# Patient Record
Sex: MALE | Race: WHITE | Employment: FULL TIME | URBAN - METROPOLITAN AREA
[De-identification: names, ages, dates, MRNs, and addresses within clinical notes are randomized per-mention and may not be internally consistent; named-entity substitution may affect disease eponyms.]

---

## 2023-01-01 ENCOUNTER — APPOINTMENT (OUTPATIENT)
Dept: CT IMAGING | Age: 48
End: 2023-01-01

## 2023-01-01 ENCOUNTER — APPOINTMENT (OUTPATIENT)
Dept: GENERAL RADIOLOGY | Age: 48
End: 2023-01-01

## 2023-01-01 ENCOUNTER — HOSPITAL ENCOUNTER (OUTPATIENT)
Age: 48
Setting detail: OBSERVATION
Discharge: HOME | End: 2023-01-02
Attending: EMERGENCY MEDICINE | Admitting: FAMILY MEDICINE

## 2023-01-01 DIAGNOSIS — R07.9 CHEST PAIN, UNSPECIFIED TYPE: ICD-10-CM

## 2023-01-01 DIAGNOSIS — R20.0 LEFT SIDED NUMBNESS: Primary | ICD-10-CM

## 2023-01-01 LAB
A/G RATIO: 1.6 (ref 1.1–2.2)
ALBUMIN SERPL-MCNC: 4.4 G/DL (ref 3.4–5)
ALP BLD-CCNC: 50 U/L (ref 40–129)
ALT SERPL-CCNC: 14 U/L (ref 10–40)
AMPHETAMINE SCREEN, URINE: ABNORMAL
ANION GAP SERPL CALCULATED.3IONS-SCNC: 9 MMOL/L (ref 3–16)
AST SERPL-CCNC: 17 U/L (ref 15–37)
BANDED NEUTROPHILS RELATIVE PERCENT: 1 % (ref 0–7)
BARBITURATE SCREEN URINE: ABNORMAL
BASOPHILS ABSOLUTE: 0 K/UL (ref 0–0.2)
BASOPHILS RELATIVE PERCENT: 0 %
BENZODIAZEPINE SCREEN, URINE: ABNORMAL
BILIRUB SERPL-MCNC: 0.4 MG/DL (ref 0–1)
BUN BLDV-MCNC: 11 MG/DL (ref 7–20)
CALCIUM SERPL-MCNC: 9.4 MG/DL (ref 8.3–10.6)
CANNABINOID SCREEN URINE: POSITIVE
CHLORIDE BLD-SCNC: 106 MMOL/L (ref 99–110)
CO2: 23 MMOL/L (ref 21–32)
COCAINE METABOLITE SCREEN URINE: ABNORMAL
CREAT SERPL-MCNC: 0.9 MG/DL (ref 0.9–1.3)
EOSINOPHILS ABSOLUTE: 0.3 K/UL (ref 0–0.6)
EOSINOPHILS RELATIVE PERCENT: 2 %
FENTANYL SCREEN, URINE: ABNORMAL
GFR SERPL CREATININE-BSD FRML MDRD: >60 ML/MIN/{1.73_M2}
GLUCOSE BLD-MCNC: 107 MG/DL (ref 70–99)
GLUCOSE BLD-MCNC: 108 MG/DL (ref 70–99)
HCT VFR BLD CALC: 49 % (ref 40.5–52.5)
HEMOGLOBIN: 15.9 G/DL (ref 13.5–17.5)
INR BLD: 0.91 (ref 0.87–1.14)
LACTIC ACID: 1 MMOL/L (ref 0.4–2)
LYMPHOCYTES ABSOLUTE: 2.8 K/UL (ref 1–5.1)
LYMPHOCYTES RELATIVE PERCENT: 22 %
Lab: ABNORMAL
MAGNESIUM: 2 MG/DL (ref 1.8–2.4)
MCH RBC QN AUTO: 29.9 PG (ref 26–34)
MCHC RBC AUTO-ENTMCNC: 32.5 G/DL (ref 31–36)
MCV RBC AUTO: 92 FL (ref 80–100)
METHADONE SCREEN, URINE: ABNORMAL
MONOCYTES ABSOLUTE: 0.8 K/UL (ref 0–1.3)
MONOCYTES RELATIVE PERCENT: 6 %
NEUTROPHILS ABSOLUTE: 8.8 K/UL (ref 1.7–7.7)
NEUTROPHILS RELATIVE PERCENT: 69 %
OPIATE SCREEN URINE: ABNORMAL
OXYCODONE URINE: ABNORMAL
PDW BLD-RTO: 13.5 % (ref 12.4–15.4)
PERFORMED ON: ABNORMAL
PH UA: 5.5
PHENCYCLIDINE SCREEN URINE: ABNORMAL
PLATELET # BLD: 328 K/UL (ref 135–450)
PLATELET SLIDE REVIEW: ADEQUATE
PMV BLD AUTO: 8.4 FL (ref 5–10.5)
POTASSIUM SERPL-SCNC: 4.4 MMOL/L (ref 3.5–5.1)
PROTHROMBIN TIME: 12.1 SEC (ref 11.7–14.5)
RBC # BLD: 5.33 M/UL (ref 4.2–5.9)
RBC # BLD: NORMAL 10*6/UL
SLIDE REVIEW: ABNORMAL
SMUDGE CELLS: PRESENT
SODIUM BLD-SCNC: 138 MMOL/L (ref 136–145)
TOTAL PROTEIN: 7.1 G/DL (ref 6.4–8.2)
TROPONIN: <0.01 NG/ML
WBC # BLD: 12.5 K/UL (ref 4–11)

## 2023-01-01 PROCEDURE — 96372 THER/PROPH/DIAG INJ SC/IM: CPT

## 2023-01-01 PROCEDURE — 6360000004 HC RX CONTRAST MEDICATION: Performed by: EMERGENCY MEDICINE

## 2023-01-01 PROCEDURE — 6370000000 HC RX 637 (ALT 250 FOR IP): Performed by: FAMILY MEDICINE

## 2023-01-01 PROCEDURE — 71045 X-RAY EXAM CHEST 1 VIEW: CPT

## 2023-01-01 PROCEDURE — 70498 CT ANGIOGRAPHY NECK: CPT

## 2023-01-01 PROCEDURE — G0378 HOSPITAL OBSERVATION PER HR: HCPCS

## 2023-01-01 PROCEDURE — 99285 EMERGENCY DEPT VISIT HI MDM: CPT

## 2023-01-01 PROCEDURE — 83605 ASSAY OF LACTIC ACID: CPT

## 2023-01-01 PROCEDURE — 85610 PROTHROMBIN TIME: CPT

## 2023-01-01 PROCEDURE — 6370000000 HC RX 637 (ALT 250 FOR IP): Performed by: EMERGENCY MEDICINE

## 2023-01-01 PROCEDURE — 85025 COMPLETE CBC W/AUTO DIFF WBC: CPT

## 2023-01-01 PROCEDURE — 6360000002 HC RX W HCPCS: Performed by: FAMILY MEDICINE

## 2023-01-01 PROCEDURE — 84484 ASSAY OF TROPONIN QUANT: CPT

## 2023-01-01 PROCEDURE — 36415 COLL VENOUS BLD VENIPUNCTURE: CPT

## 2023-01-01 PROCEDURE — 93005 ELECTROCARDIOGRAM TRACING: CPT | Performed by: EMERGENCY MEDICINE

## 2023-01-01 PROCEDURE — 2580000003 HC RX 258: Performed by: FAMILY MEDICINE

## 2023-01-01 PROCEDURE — 83735 ASSAY OF MAGNESIUM: CPT

## 2023-01-01 PROCEDURE — 80053 COMPREHEN METABOLIC PANEL: CPT

## 2023-01-01 PROCEDURE — 80307 DRUG TEST PRSMV CHEM ANLYZR: CPT

## 2023-01-01 PROCEDURE — 70450 CT HEAD/BRAIN W/O DYE: CPT

## 2023-01-01 RX ORDER — IPRATROPIUM BROMIDE AND ALBUTEROL SULFATE 2.5; .5 MG/3ML; MG/3ML
1 SOLUTION RESPIRATORY (INHALATION) EVERY 4 HOURS PRN
Status: DISCONTINUED | OUTPATIENT
Start: 2023-01-01 | End: 2023-01-02 | Stop reason: HOSPADM

## 2023-01-01 RX ORDER — HYDROXYZINE PAMOATE 25 MG/1
50 CAPSULE ORAL ONCE
Status: COMPLETED | OUTPATIENT
Start: 2023-01-01 | End: 2023-01-01

## 2023-01-01 RX ORDER — ACETAMINOPHEN 650 MG/1
650 SUPPOSITORY RECTAL EVERY 6 HOURS PRN
Status: DISCONTINUED | OUTPATIENT
Start: 2023-01-01 | End: 2023-01-02 | Stop reason: HOSPADM

## 2023-01-01 RX ORDER — SODIUM CHLORIDE 0.9 % (FLUSH) 0.9 %
5-40 SYRINGE (ML) INJECTION EVERY 12 HOURS SCHEDULED
Status: DISCONTINUED | OUTPATIENT
Start: 2023-01-01 | End: 2023-01-02 | Stop reason: HOSPADM

## 2023-01-01 RX ORDER — SODIUM CHLORIDE 9 MG/ML
INJECTION, SOLUTION INTRAVENOUS PRN
Status: DISCONTINUED | OUTPATIENT
Start: 2023-01-01 | End: 2023-01-02 | Stop reason: HOSPADM

## 2023-01-01 RX ORDER — ONDANSETRON 2 MG/ML
4 INJECTION INTRAMUSCULAR; INTRAVENOUS EVERY 6 HOURS PRN
Status: DISCONTINUED | OUTPATIENT
Start: 2023-01-01 | End: 2023-01-02 | Stop reason: HOSPADM

## 2023-01-01 RX ORDER — ASPIRIN 81 MG/1
81 TABLET, CHEWABLE ORAL DAILY
Status: DISCONTINUED | OUTPATIENT
Start: 2023-01-02 | End: 2023-01-02 | Stop reason: HOSPADM

## 2023-01-01 RX ORDER — ONDANSETRON 4 MG/1
4 TABLET, ORALLY DISINTEGRATING ORAL EVERY 8 HOURS PRN
Status: DISCONTINUED | OUTPATIENT
Start: 2023-01-01 | End: 2023-01-02 | Stop reason: HOSPADM

## 2023-01-01 RX ORDER — THEOPHYLLINE 300 MG/1
300 TABLET, EXTENDED RELEASE ORAL 2 TIMES DAILY
Status: DISCONTINUED | OUTPATIENT
Start: 2023-01-01 | End: 2023-01-02 | Stop reason: HOSPADM

## 2023-01-01 RX ORDER — ASPIRIN 81 MG/1
324 TABLET, CHEWABLE ORAL ONCE
Status: COMPLETED | OUTPATIENT
Start: 2023-01-01 | End: 2023-01-01

## 2023-01-01 RX ORDER — POLYETHYLENE GLYCOL 3350 17 G/17G
17 POWDER, FOR SOLUTION ORAL DAILY PRN
Status: DISCONTINUED | OUTPATIENT
Start: 2023-01-01 | End: 2023-01-02 | Stop reason: HOSPADM

## 2023-01-01 RX ORDER — ENOXAPARIN SODIUM 100 MG/ML
40 INJECTION SUBCUTANEOUS DAILY
Status: DISCONTINUED | OUTPATIENT
Start: 2023-01-01 | End: 2023-01-02 | Stop reason: HOSPADM

## 2023-01-01 RX ORDER — ACETAMINOPHEN 325 MG/1
650 TABLET ORAL EVERY 6 HOURS PRN
Status: DISCONTINUED | OUTPATIENT
Start: 2023-01-01 | End: 2023-01-02 | Stop reason: HOSPADM

## 2023-01-01 RX ORDER — IPRATROPIUM BROMIDE AND ALBUTEROL SULFATE 2.5; .5 MG/3ML; MG/3ML
1 SOLUTION RESPIRATORY (INHALATION) 2 TIMES DAILY
COMMUNITY

## 2023-01-01 RX ORDER — SODIUM CHLORIDE 0.9 % (FLUSH) 0.9 %
5-40 SYRINGE (ML) INJECTION PRN
Status: DISCONTINUED | OUTPATIENT
Start: 2023-01-01 | End: 2023-01-02 | Stop reason: HOSPADM

## 2023-01-01 RX ORDER — KETOROLAC TROMETHAMINE 30 MG/ML
15 INJECTION, SOLUTION INTRAMUSCULAR; INTRAVENOUS EVERY 6 HOURS PRN
Status: DISCONTINUED | OUTPATIENT
Start: 2023-01-01 | End: 2023-01-02 | Stop reason: HOSPADM

## 2023-01-01 RX ADMIN — ASPIRIN 324 MG: 81 TABLET, CHEWABLE ORAL at 15:41

## 2023-01-01 RX ADMIN — ENOXAPARIN SODIUM 40 MG: 100 INJECTION SUBCUTANEOUS at 18:19

## 2023-01-01 RX ADMIN — Medication 10 ML: at 20:55

## 2023-01-01 RX ADMIN — THEOPHYLLINE 300 MG: 300 TABLET, EXTENDED RELEASE ORAL at 20:55

## 2023-01-01 RX ADMIN — HYDROXYZINE PAMOATE 50 MG: 25 CAPSULE ORAL at 15:41

## 2023-01-01 RX ADMIN — IOPAMIDOL 75 ML: 755 INJECTION, SOLUTION INTRAVENOUS at 11:11

## 2023-01-01 ASSESSMENT — LIFESTYLE VARIABLES
HOW MANY STANDARD DRINKS CONTAINING ALCOHOL DO YOU HAVE ON A TYPICAL DAY: PATIENT DOES NOT DRINK
HOW OFTEN DO YOU HAVE A DRINK CONTAINING ALCOHOL: NEVER

## 2023-01-01 ASSESSMENT — PAIN SCALES - GENERAL
PAINLEVEL_OUTOF10: 0
PAINLEVEL_OUTOF10: 0
PAINLEVEL_OUTOF10: 7

## 2023-01-01 ASSESSMENT — PAIN - FUNCTIONAL ASSESSMENT: PAIN_FUNCTIONAL_ASSESSMENT: 0-10

## 2023-01-01 NOTE — PLAN OF CARE
Problem: Pain  Goal: Verbalizes/displays adequate comfort level or baseline comfort level  Outcome: Progressing  Flowsheets (Taken 1/1/2023 1656)  Verbalizes/displays adequate comfort level or baseline comfort level: Encourage patient to monitor pain and request assistance  Note: Pt admitted to 3a from ER, no c/o pain at this time, /77   Pulse 68   Temp 98.7 °F (37.1 °C)   Resp 16   SpO2 99%   Oriented to room and call light, updated on plan of care ie stress test in am and troponinx3, pt requesting that his home meds be ordered, message sent to Dr. Jana Lisa, SR on tele

## 2023-01-01 NOTE — PROGRESS NOTES
Telemedicine Consult Note   Stroke Team          Patient Name: Meena Ramesh (5 y.o. male)  MRN: [id number]  : 1975  Admission Date: 2023   Current Date: 23    Melecio Murrell is a 55 YO man with history of HTN, HLD, and hypothyroidism who presents to the ED with acute onset of left arm numbness and heaviness. LKW: 2200 last night. He went to sleep with these symptoms. Prior to onset of symptoms, he had consumed a marijuana edible. When he woke up this morning, he had left arm and leg numbness, chest pain, and light-headedness. He is not on anticoagulation. Vitals:    23 1123   BP: (!) 147/75   Pulse: 84   Resp: 16   Temp: 98.7 °F (37.1 °C)   SpO2: 92%     CT head without contrast did not reveal hemorrhage and CTA head/neck did not show LVO nor flow limiting stenosis. As he is outside of the window period, he is not a candidate for IV thrombolysis and there is no indication for EVT as there is no LVO. The differential could be secondary to illicit drug use vs acute stroke. Recommendations:   - Neurology consult for further evaluation.      Fela Chew M.D  PGY 5 [facility] Vascular Neurology Fellow

## 2023-01-01 NOTE — H&P
HOSPITALISTS HISTORY AND PHYSICAL    1/1/2023 6:48 PM    Patient Information:  Rico Valadez is a 56 y.o. male [id number]  PCP:  Shelli Tavera MD (Tel: [phone] )    Chief complaint:    Chief Complaint   Patient presents with    Chest Pain     Last night around 2200 patient began to have L arm weakness and numbness. States he takes edibles and sometimes feels funny but this was different. Today also began with chest discomfort while at work, stroke alert called     History of Present Illness:  Ela Steinberg is a 56 y.o. male with h/o Asthma, obesity, nicotine use presented with c/o left sided chest pain associated with left arm heaviness and numbness. Symptoms started last night . Occurred again today . Also reports dyspnea. +ve family history of heart disease in grandmother. No recent stress test  ED work up showed   Troponin < 0.01 ;EKG NSR   Chest xray showed no acute process   He was given full dose ASA   He is pain free now       REVIEW OF SYSTEMS:   Constitutional: Negative for fever,chills or night sweats  ENT: Negative for rhinorrhea, epistaxis, hoarseness, sore throat. Respiratory: Negative for shortness of breath,wheezing  Cardiovascular: +ve for chest pain, palpitations   Gastrointestinal: Negative for nausea, vomiting, diarrhea  Genitourinary: Negative for polyuria, dysuria   Hematologic/Lymphatic: Negative for bleeding tendency, easy bruising  Musculoskeletal: Negative for myalgias and arthralgias  Neurologic: Negative for confusion,dysarthria. Skin: Negative for itching,rash  Psychiatric: Negative for depression,anxiety, agitation. Endocrine: Negative for polydipsia,polyuria,heat /cold intolerance. Past Medical History:   has no past medical history on file.      Past Surgical History:   has a past surgical history that includes Appendectomy; knee surgery; and hernia repair. Medications:  No current facility-administered medications on file prior to encounter.      Current Outpatient Medications on File Prior to Encounter   Medication Sig Dispense Refill    ipratropium-albuterol (DUONEB) 0.5-2.5 (3) MG/3ML SOLN nebulizer solution Take 1 vial by nebulization in the morning and at bedtime      theophylline (CLAUDIA-24) 200 MG extended release capsule Take 300 mg by mouth in the morning and at bedtime      ALBUTEROL SULFATE IN Inhale 2 puffs into the lungs      ketorolac (TORADOL) 10 MG tablet Take 1 tablet by mouth every 6 hours as needed for Pain 20 tablet 0     Current Facility-Administered Medications   Medication Dose Route Frequency Provider Last Rate Last Admin    ketorolac (TORADOL) injection 15 mg  15 mg IntraVENous Q6H PRN MD Kehinde Soto ON 1/2/2023] aspirin chewable tablet 81 mg  81 mg Oral Daily Alpa Liu MD        sodium chloride flush 0.9 % injection 5-40 mL  5-40 mL IntraVENous 2 times per day Anika Bravo MD        sodium chloride flush 0.9 % injection 5-40 mL  5-40 mL IntraVENous PRN Alpa Liu MD        0.9 % sodium chloride infusion   IntraVENous PRN Anika Bravo MD        enoxaparin (LOVENOX) injection 40 mg  40 mg SubCUTAneous Daily Alpa Liu MD   40 mg at 01/01/23 1819    ondansetron (ZOFRAN-ODT) disintegrating tablet 4 mg  4 mg Oral Q8H PRN Anika Bravo MD        Or    ondansetron (ZOFRAN) injection 4 mg  4 mg IntraVENous Q6H PRN Anika Bravo MD        polyethylene glycol (GLYCOLAX) packet 17 g  17 g Oral Daily PRN Anika Bravo MD        acetaminophen (TYLENOL) tablet 650 mg  650 mg Oral Q6H PRN Anika Bravo MD        Or    acetaminophen (TYLENOL) suppository 650 mg  650 mg Rectal Q6H PRN Anika Bravo MD        theophylline (THEODUR) extended release tablet 300 mg  300 mg Oral BID Anika Bravo MD        ipratropium-albuterol (DUONEB) nebulizer solution 1 ampule  1 ampule Inhalation Q4H PRN Anika Bravo MD Allergies: Allergies   Allergen Reactions    Latex Rash        Social History:  Patient Lives    reports that he has never smoked. He uses smokeless tobacco. He reports that he does not drink alcohol and does not use drugs. Family History:  family history is not on file. , family history reviewed not pertinent to current admission      Physical Exam:  /77   Pulse 68   Temp 98.7 °F (37.1 °C)   Resp 16   SpO2 99%     General appearance:  Appears comfortable. Well nourished  Eyes: Sclera clear, pupils equal  ENT: Moist mucus membranes, no thrush. Trachea midline. Cardiovascular: Regular rhythm, normal S1, S2. No murmur, gallop, rub. No edema in lower extremities  Respiratory: Clear to auscultation bilaterally, no wheeze, good inspiratory effort  Gastrointestinal: Abdomen soft, non-tender, not distended, normal bowel sounds  Musculoskeletal: No cyanosis in digits, neck supple  Neurology: Cranial nerves grossly intact. Alert and oriented in time, place and person. No speech or motor deficits  Psychiatry: Appropriate affect. Not agitated  Skin: Warm, dry, normal turgor, no rash  Brisk capillary refill, peripheral pulses palpable   Labs:  CBC:   Lab Results   Component Value Date/Time    WBC 12.5 01/01/2023 11:02 AM    RBC 5.33 01/01/2023 11:02 AM    HGB 15.9 01/01/2023 11:02 AM    HCT 49.0 01/01/2023 11:02 AM    MCV 92.0 01/01/2023 11:02 AM    MCH 29.9 01/01/2023 11:02 AM    MCHC 32.5 01/01/2023 11:02 AM    RDW 13.5 01/01/2023 11:02 AM     01/01/2023 11:02 AM    MPV 8.4 01/01/2023 11:02 AM     BMP:    Lab Results   Component Value Date/Time     01/01/2023 11:02 AM    K 4.4 01/01/2023 11:02 AM     01/01/2023 11:02 AM    CO2 23 01/01/2023 11:02 AM    BUN 11 01/01/2023 11:02 AM    CREATININE 0.9 01/01/2023 11:02 AM    CALCIUM 9.4 01/01/2023 11:02 AM    LABGLOM >60 01/01/2023 11:02 AM    GLUCOSE 108 01/01/2023 11:02 AM     XR CHEST PORTABLE   Final Result   No acute process.          CTA HEAD NECK W CONTRAST   Final Result   1. No evidence of large vessel occlusion or significant stenosis in the major   arteries of the head and neck. 2. Multilevel cervical spondylosis, most notable at C5-C6 and C6-C7   (left-sided neural foraminal narrowing). No high-grade bony spinal canal   stenosis. CT HEAD WO CONTRAST   Final Result   Addendum (preliminary) 1 of 1   ADDENDUM:   Critical results were called by results communication to Elizabeth    on   1/1/2023 at 11:26 a.m. .         Final   No acute intracranial abnormality. NM MYOCARDIAL SPECT REST EXERCISE OR RX    (Results Pending)     Chest Xray:   EKG:    I visualized CXR images and EKG strips    Discussed case  with     Problem List  Principal Problem:    Chest pain  Resolved Problems:    * No resolved hospital problems. *        Assessment/Plan:   Chest pain   Risk factors family history, obesity , nicotine use  admit to r/out ACS  Cont to trend troponin   Stress test and ECHO ordered  On nitro     Smudge cells incidental findings on CBC  D/w pt   Advised out pt hematology consult    DVT prophylaxis   Code status   Diet   IV access   Francis Catheter    Admit as obs. I anticipate hospitalization spanning less than two midnights for investigation and treatment of the above medically necessary diagnoses. Please note that some part of this chart was generated using Dragon dictation software. Although every effort was made to ensure the accuracy of this automated transcription, some errors in transcription may have occurred inadvertently. If you may need any clarification, please do not hesitate to contact me through [facility].        Corbin Bear MD    1/1/2023 6:48 PM

## 2023-01-01 NOTE — ED PROVIDER NOTES
EMERGENCY DEPARTMENT ENCOUNTER        Pt Name: Meena Ramesh  MRN: [id number]  [place] 1975  Date of evaluation: 1/1/2023  Provider: Susan Meng MD  PCP: Do Cheng MD      [address] [place] [address]       Chief Complaint   Patient presents with    Chest Pain     Last night around 2200 patient began to have L arm weakness and numbness. States he takes edibles and sometimes feels funny but this was different. Today also began with chest discomfort while at work, stroke alert called       HISTORY OFPRESENT ILLNESS   (Location/Symptom, Timing/Onset, Context/Setting, Quality, Duration, Modifying Factors,Severity)  Note limiting factors. Meena Ramesh is a 56 y.o. male presenting today due to concern for developing significant left arm heaviness and numbness starting around 10 PM last night starting shortly after taking a marijuana edible that did cause some palpitations although never having symptoms like this before. He tried to go to work today but was still having the persistent numbness and then started developing some chest pain with shortness of breath which ultimately prompted him to come to the emergency department. His numbness has never resolved since last night. He denies any numbness to the face or legs. No falls or trauma. He denies any concern with the marijuana being laced with anything. He does not use cocaine or amphetamines. No vomiting or diarrhea. No headache today or history of migraines although he did have a mild right-sided headache yesterday. Due to concern for chest pain but also persistent left arm numbness, he came to the ED for further assessment. He is right-handed. REVIEW OF SYSTEMS    (2-9 systems for level 4, 10 or more for level 5)     Review of Systems      Positives and Pertinent negatives as per HPI. PASTMEDICAL HISTORY   History reviewed. No pertinent past medical history.       SURGICAL HISTORY       Past Surgical History: Procedure Laterality Date    APPENDECTOMY      HERNIA REPAIR      KNEE SURGERY           CURRENT MEDICATIONS       Discharge Medication List as of 1/2/2023 11:11 AM        CONTINUE these medications which have NOT CHANGED    Details   ipratropium-albuterol (DUONEB) 0.5-2.5 (3) MG/3ML SOLN nebulizer solution Take 1 vial by nebulization in the morning and at bedtimeHistorical Med      theophylline (CLAUDIA-24) 200 MG extended release capsule Take 300 mg by mouth in the morning and at bedtimeHistorical Med      ALBUTEROL SULFATE IN Inhale 2 puffs into the lungsHistorical Med      ketorolac (TORADOL) 10 MG tablet Take 1 tablet by mouth every 6 hours as needed for Pain, Disp-20 tablet, R-0Print             ALLERGIES     Latex    FAMILY HISTORY     History reviewed. No pertinent family history. SOCIAL HISTORY       Social History     Socioeconomic History    Marital status:      Spouse name: None    Number of children: None    Years of education: None    Highest education level: None   Tobacco Use    Smoking status: Never    Smokeless tobacco: Current   Substance and Sexual Activity    Alcohol use: No    Drug use: No       SCREENINGS   NIH Stroke Scale  NIH Stroke Scale Assessed: No  Interval: Baseline  Level of Consciousness (1a): Alert  LOC Questions (1b): Answers both correctly  LOC Commands (1c): Performs both tasks correctly  Best Gaze (2): Normal  Visual (3): No visual loss  Facial Palsy (4): Normal symmetrical movement  Motor Arm, Left (5a): No drift  Motor Arm, Right (5b): No drift  Motor Leg, Left (6a): No drift  Motor Leg, Right (6b):  No drift  Limb Ataxia (7): Absent  Sensory (8): (!) Mild to Moderate  Best Language (9): No aphasia  Dysarthria (10): Normal  Extinction and Inattention (11): No abnormality  Total: 1Glasgow Coma Scale  Eye Opening: Spontaneous  Best Verbal Response: Oriented  Best Motor Response: Obeys commands  [place] Coma Scale Score: 15           PHYSICAL EXAM    (up to 7 for level 4, 8 or more for level 5)     ED Triage Vitals   BP Temp Temp src Pulse Resp SpO2 Height Weight   -- -- -- -- -- -- -- --       Physical Exam  Vitals and nursing note reviewed. Constitutional:       General: He is awake. He is not in acute distress. Appearance: Normal appearance. He is well-developed, well-groomed and overweight. He is not ill-appearing, toxic-appearing or diaphoretic. Interventions: He is not intubated. HENT:      Head: Normocephalic and atraumatic. Right Ear: External ear normal.      Left Ear: External ear normal.      Nose: Nose normal.      Mouth/Throat:      Mouth: Mucous membranes are moist.   Eyes:      General: No scleral icterus. Right eye: No discharge. Left eye: No discharge. Extraocular Movements: Extraocular movements intact. Conjunctiva/sclera: Conjunctivae normal.      Pupils: Pupils are equal, round, and reactive to light. Neck:      Trachea: Trachea and phonation normal. No tracheal tenderness or tracheal deviation. Cardiovascular:      Rate and Rhythm: Normal rate and regular rhythm. Pulses: Normal pulses. Radial pulses are 2+ on the right side and 2+ on the left side. Pulmonary:      Effort: Pulmonary effort is normal. No tachypnea, bradypnea, accessory muscle usage, prolonged expiration, respiratory distress or retractions. He is not intubated. Breath sounds: Normal breath sounds and air entry. No stridor. No decreased breath sounds, wheezing, rhonchi or rales. Chest:      Chest wall: No tenderness. Abdominal:      General: Abdomen is flat. Bowel sounds are normal. There is no distension. Palpations: Abdomen is soft. Tenderness: There is no abdominal tenderness. There is no guarding or rebound. Musculoskeletal:         General: No tenderness, deformity or signs of injury. Normal range of motion. Cervical back: Full passive range of motion without pain, normal range of motion and neck supple. No edema, erythema, signs of trauma, rigidity, torticollis, tenderness or crepitus. No pain with movement, spinous process tenderness or muscular tenderness. Normal range of motion. Right lower leg: No edema. Left lower leg: No edema. Skin:     General: Skin is warm and dry. Coloration: Skin is not jaundiced or pale. Findings: No erythema or rash. Neurological:      General: No focal deficit present. Mental Status: He is alert and oriented to person, place, and time. Mental status is at baseline. GCS: GCS eye subscore is 4. GCS verbal subscore is 5. GCS motor subscore is 6. Cranial Nerves: Cranial nerves 2-12 are intact. No cranial nerve deficit, dysarthria or facial asymmetry. Sensory: Sensation is intact. No sensory deficit. Motor: Motor function is intact. No weakness, tremor, atrophy, abnormal muscle tone, seizure activity or pronator drift. Coordination: Coordination is intact. Finger-Nose-Finger Test normal.      Gait: Gait is intact. Gait normal.      Comments: NIHSS = 1 (numbness to left arm and left leg)    Psychiatric:         Attention and Perception: Attention normal. He is attentive. Mood and Affect: Affect normal. Mood is anxious. Mood is not depressed. Speech: Speech normal. Speech is not delayed or slurred. Behavior: Behavior normal. Behavior is cooperative. Thought Content: Thought content normal. Thought content does not include suicidal ideation. Thought content does not include suicidal plan.            DIAGNOSTIC RESULTS   :    Labs Reviewed   CBC WITH AUTO DIFFERENTIAL - Abnormal; Notable for the following components:       Result Value    WBC 12.5 (*)     Neutrophils Absolute 8.8 (*)     Smudge Cells Present (*)     All other components within normal limits   COMPREHENSIVE METABOLIC PANEL - Abnormal; Notable for the following components:    Glucose 108 (*)     All other components within normal limits   URINE DRUG SCREEN - Abnormal; Notable for the following components:    Cannabinoid Scrn, Ur POSITIVE (*)     All other components within normal limits   BASIC METABOLIC PANEL - Abnormal; Notable for the following components:    Glucose 102 (*)     All other components within normal limits   LIPID PANEL - Abnormal; Notable for the following components:    Cholesterol, Total 201 (*)     HDL 34 (*)     LDL Calculated 139 (*)     All other components within normal limits   POCT GLUCOSE - Abnormal; Notable for the following components:    POC Glucose 107 (*)     All other components within normal limits   PROTIME-INR   MAGNESIUM   TROPONIN   LACTIC ACID   TROPONIN   TROPONIN   TROPONIN   CBC   POCT GLUCOSE   POCT GLUCOSE       All other labs were within normal range or not returned asof this dictation. EKG: All EKG's are interpreted by the Emergency Department Physician who either signs or Co-signs this chart in the absence of a cardiologist.    The Ekg interpreted by me shows  normal sinus rhythm with a rate of 86  Axis is   Normal  QTc is  normal  Intervals and Durations are unremarkable. ST Segments: normal  No significant change from prior EKG dated -  no old EKG  No STEMI         RADIOLOGY:   Non-plain film images such as CT, Ultrasound and MRI are read by the radiologist. [place] images are visualized and preliminarily interpreted by the  ED Provider with the belowfindings:        Interpretation per the Radiologist below, if available at the time of this note:    XR CHEST PORTABLE   Final Result   No acute process. CTA HEAD NECK W CONTRAST   Final Result   1. No evidence of large vessel occlusion or significant stenosis in the major   arteries of the head and neck. 2. Multilevel cervical spondylosis, most notable at C5-C6 and C6-C7   (left-sided neural foraminal narrowing). No high-grade bony spinal canal   stenosis.          CT HEAD WO CONTRAST   Final Result   Addendum (preliminary) 1 of 1 ADDENDUM:   Critical results were called by results communication to Elizabeth    on   1/1/2023 at 11:26 a.m. .         Final   No acute intracranial abnormality. NM MYOCARDIAL SPECT REST EXERCISE OR RX    (Results Pending)         PROCEDURES   Unless otherwise noted below, none     Procedures    CRITICAL CARE TIME   N/A    CONSULTS: Spoke with stroke team Dr. Rachid Phillips upon arrival and he did recommend CTA although no need for TNK based on symptoms starting yesterday evening. Paged hospitalist for admission. IP CONSULT TO STROKE TEAM  IP CONSULT TO HOSPITALIST  IP CONSULT TO CARDIOLOGY    EMERGENCY DEPARTMENT COURSE and DIFFERENTIAL DIAGNOSIS/MDM:   Vitals:    Vitals:    01/01/23 2028 01/02/23 0030 01/02/23 0500 01/02/23 0830   BP: 120/62 116/69 117/63 114/64   Pulse: 69 97 60 64   Resp: 16 18 18 18   Temp: 98 °F (36.7 °C) 97.5 °F (36.4 °C) 97.5 °F (36.4 °C) 97.3 °F (36.3 °C)   TempSrc: Oral Oral Oral Oral   SpO2: 97% 95%  96%   Weight:   211 lb (95.7 kg)        Patient was given the following medications:  Medications   iopamidol (ISOVUE-370) 76 % injection 75 mL (75 mLs IntraVENous Given 1/1/23 1111)   aspirin chewable tablet 324 mg (324 mg Oral Given 1/1/23 1541)   hydrOXYzine pamoate (VISTARIL) capsule 50 mg (50 mg Oral Given 1/1/23 1541)     Patient was evaluated due to having persistent left arm numbness since yesterday evening after using marijuana and never feeling like this before. On exam he also had left leg numbness compared to the right leg which she was unaware of and therefore based on this, we did speak to stroke team who recommended imaging but no emergent treatment at this point. He had persistent numbness while in the ED and therefore I ultimately did recommend admission in case he did have a mild stroke understanding that if it was related to this, it could happen again in the next time could cause severe disability or death.   He ultimately spoke to his sister who is a physician and she also thought that he should stay in the hospital based on what he told the nurse. His chest pain did improve in the ED and EKG did not show any concern for STEMI and troponin was negative. He was admitted in stable condition and ultimately agreed with this plan. He denied any pain with breathing and story not suggestive of pulmonary embolism. He did report having palpitations last night although EKG did not show any concern for atrial fibrillation. He denied any headache today but did have a mild right-sided headache yesterday and therefore I do wonder if he had an atypical migraine although he does not normally get headaches and therefore I do not feel comfortable diagnosing him with that at this point. I was the primary provider for the patient. The patient tolerated their visit well. The patient and / or the family were informed of the results of any tests, a time was given to answer questions. FINAL IMPRESSION      1. Left sided numbness    2. Chest pain, unspecified type          DISPOSITION/PLAN   DISPOSITION Admitted 01/01/2023 02:58:12 PM      PATIENT REFERRED TO:  No follow-up provider specified.     DISCHARGEMEDICATIONS:  Discharge Medication List as of 1/2/2023 11:11 AM          DISCONTINUED MEDICATIONS:  Discharge Medication List as of 1/2/2023 11:11 AM                 (Please note that portions of this note were completed with a voicerecognition program.  Efforts were made to edit the dictations but occasionally words are mis-transcribed.)    Luis Alberto Garcia MD (electronically signed)            Luis Alberto Garcia MD  01/02/23 1800

## 2023-01-01 NOTE — ED NOTES
Pt report given to 3A RN, states no questions or concerns. Pt transported to floor via wheelchair by floor RN.       Shana Michaud RN  01/01/23 2052

## 2023-01-02 VITALS
SYSTOLIC BLOOD PRESSURE: 114 MMHG | RESPIRATION RATE: 18 BRPM | OXYGEN SATURATION: 96 % | TEMPERATURE: 97.3 F | DIASTOLIC BLOOD PRESSURE: 64 MMHG | WEIGHT: 211 LBS | HEART RATE: 64 BPM | BODY MASS INDEX: 28.62 KG/M2

## 2023-01-02 LAB
ANION GAP SERPL CALCULATED.3IONS-SCNC: 11 MMOL/L (ref 3–16)
BUN BLDV-MCNC: 11 MG/DL (ref 7–20)
CALCIUM SERPL-MCNC: 9.1 MG/DL (ref 8.3–10.6)
CHLORIDE BLD-SCNC: 105 MMOL/L (ref 99–110)
CHOLESTEROL, TOTAL: 201 MG/DL (ref 0–199)
CO2: 22 MMOL/L (ref 21–32)
CREAT SERPL-MCNC: 0.9 MG/DL (ref 0.9–1.3)
EKG ATRIAL RATE: 86 BPM
EKG DIAGNOSIS: NORMAL
EKG P AXIS: 59 DEGREES
EKG P-R INTERVAL: 148 MS
EKG Q-T INTERVAL: 358 MS
EKG QRS DURATION: 88 MS
EKG QTC CALCULATION (BAZETT): 428 MS
EKG R AXIS: 20 DEGREES
EKG T AXIS: 29 DEGREES
EKG VENTRICULAR RATE: 86 BPM
GFR SERPL CREATININE-BSD FRML MDRD: >60 ML/MIN/{1.73_M2}
GLUCOSE BLD-MCNC: 102 MG/DL (ref 70–99)
GLUCOSE BLD-MCNC: 99 MG/DL (ref 70–99)
HCT VFR BLD CALC: 45.1 % (ref 40.5–52.5)
HDLC SERPL-MCNC: 34 MG/DL (ref 40–60)
HEMOGLOBIN: 15.4 G/DL (ref 13.5–17.5)
LDL CHOLESTEROL CALCULATED: 139 MG/DL
MCH RBC QN AUTO: 30.8 PG (ref 26–34)
MCHC RBC AUTO-ENTMCNC: 34.2 G/DL (ref 31–36)
MCV RBC AUTO: 90.3 FL (ref 80–100)
PDW BLD-RTO: 13.7 % (ref 12.4–15.4)
PERFORMED ON: NORMAL
PLATELET # BLD: 309 K/UL (ref 135–450)
PMV BLD AUTO: 8.2 FL (ref 5–10.5)
POTASSIUM SERPL-SCNC: 4.4 MMOL/L (ref 3.5–5.1)
RBC # BLD: 4.99 M/UL (ref 4.2–5.9)
SODIUM BLD-SCNC: 138 MMOL/L (ref 136–145)
TRIGL SERPL-MCNC: 140 MG/DL (ref 0–150)
VLDLC SERPL CALC-MCNC: 28 MG/DL
WBC # BLD: 7.4 K/UL (ref 4–11)

## 2023-01-02 PROCEDURE — 36415 COLL VENOUS BLD VENIPUNCTURE: CPT

## 2023-01-02 PROCEDURE — 85027 COMPLETE CBC AUTOMATED: CPT

## 2023-01-02 PROCEDURE — 6370000000 HC RX 637 (ALT 250 FOR IP): Performed by: FAMILY MEDICINE

## 2023-01-02 PROCEDURE — 2580000003 HC RX 258: Performed by: FAMILY MEDICINE

## 2023-01-02 PROCEDURE — G0378 HOSPITAL OBSERVATION PER HR: HCPCS

## 2023-01-02 PROCEDURE — 80061 LIPID PANEL: CPT

## 2023-01-02 PROCEDURE — 80048 BASIC METABOLIC PNL TOTAL CA: CPT

## 2023-01-02 RX ORDER — ATORVASTATIN CALCIUM 40 MG/1
40 TABLET, FILM COATED ORAL NIGHTLY
Status: DISCONTINUED | OUTPATIENT
Start: 2023-01-02 | End: 2023-01-02 | Stop reason: HOSPADM

## 2023-01-02 RX ADMIN — Medication 10 ML: at 08:47

## 2023-01-02 RX ADMIN — THEOPHYLLINE 300 MG: 300 TABLET, EXTENDED RELEASE ORAL at 08:49

## 2023-01-02 RX ADMIN — ASPIRIN 81 MG: 81 TABLET, CHEWABLE ORAL at 08:47

## 2023-01-02 ASSESSMENT — PAIN SCALES - GENERAL: PAINLEVEL_OUTOF10: 0

## 2023-01-02 NOTE — CONSULTS
[facility]   Cardiac Evaluation      Patient: Amy Talbot  YOB: 1975         Chief Complaint   Patient presents with    Chest Pain     Last night around 2200 patient began to have L arm weakness and numbness. States he takes edibles and sometimes feels funny but this was different. Today also began with chest discomfort while at work, stroke alert called        Referring provider: Hailey Juarez MD    History of Present Illness:   55 yo WM admitted through the ER due to left arm numbness which lasted hours after having a marijuana \"edible\" . He states he did have some associated upper left chest pain. He was evaluated for a stroke. He states he also had trouble finding his words. He has no previous hx of CAD but has a positive family hx and smokes. His EKG is normal and trops are neg x 3. He has had no previous exertional chest pain or arm numbness. He compalined of some dyspnea but has asthma and states it is worse when he smokes and he has been smoking. He was scheduled for a GXT and echo this am but it is a holiday and he does not wish to stay in the hospital for testing. He was also found to have smudge cells on a peripheral smear with no other abnormal cell count or morphology. His grandmother had sudden cardiac death in her 62s and his father had CAD but  of COVID. 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 A telemedicine stroke eval was done and recommended neurology consult. Past Medical History:   has no past medical history on file. Surgical History:   has a past surgical history that includes Appendectomy; knee surgery; and hernia repair.      Current Facility-Administered Medications   Medication Dose Route Frequency Provider Last Rate Last Admin    ketorolac (TORADOL) injection 15 mg  15 mg IntraVENous Q6H PRN Jairo Felix MD        aspirin chewable tablet 81 mg  81 mg Oral Daily Jairo Felix MD   81 mg at 23 0847    sodium chloride flush 0.9 % injection 5-40 mL  5-40 mL IntraVENous 2 times per day Hyacinth Zambrano MD   10 mL at 01/02/23 0847    sodium chloride flush 0.9 % injection 5-40 mL  5-40 mL IntraVENous PRN Hyacinth Zambrano MD        0.9 % sodium chloride infusion   IntraVENous PRN Hyacinth Zambrano MD        enoxaparin (LOVENOX) injection 40 mg  40 mg SubCUTAneous Daily Hyacinth Zambrano MD   40 mg at 01/01/23 1819    ondansetron (ZOFRAN-ODT) disintegrating tablet 4 mg  4 mg Oral Q8H PRN Hyacinth Zambrano MD        Or    ondansetron (ZOFRAN) injection 4 mg  4 mg IntraVENous Q6H PRN Hyacinth Zambrano MD        polyethylene glycol (GLYCOLAX) packet 17 g  17 g Oral Daily PRN Hyacinth Zambrano MD        acetaminophen (TYLENOL) tablet 650 mg  650 mg Oral Q6H PRN Hyacinth Zambrano MD        Or    acetaminophen (TYLENOL) suppository 650 mg  650 mg Rectal Q6H PRN Hyacinth Zambrano MD        theophylline (THEODUR) extended release tablet 300 mg  300 mg Oral BID Hyacinth Zambrano MD   300 mg at 01/02/23 0849    ipratropium-albuterol (DUONEB) nebulizer solution 1 ampule  1 ampule Inhalation Q4H PRN Hyacinth Zambrano MD           Allergies:  Latex     Social History:  Social History     Socioeconomic History    Marital status:      Spouse name: Not on file    Number of children: Not on file    Years of education: Not on file    Highest education level: Not on file   Occupational History    Not on file   Tobacco Use    Smoking status: Never    Smokeless tobacco: Current   Substance and Sexual Activity    Alcohol use: No    Drug use: No    Sexual activity: Not on file   Other Topics Concern    Not on file   Social History Narrative    Not on file     Social Determinants of Health     Financial Resource Strain: Not on file   Food Insecurity: Not on file   Transportation Needs: Not on file   Physical Activity: Not on file   Stress: Not on file   Social Connections: Not on file   Intimate Partner Violence: Not on file   Housing Stability: Not on file       Family History: History reviewed. No pertinent family history. Family history has been reviewed and not pertinent except as noted above. Review of Systems:   Constitutional: there has been no unanticipated weight loss. No change in energy or activity level   Eyes: No visual changes   ENT: No Headaches, hearing loss or vertigo. No mouth sores or sore throat. Cardiovascular: Reviewed in HPI  Respiratory: No cough or wheezing, no sputum production. Gastrointestinal: No abdominal pain, appetite loss, blood in stools. No change in bowel or bladder habits. Genitourinary: No nocturia, dysuria, trouble voiding  Musculoskeletal:  No gait disturbance, weakness or joint complaints. Integumentary: No rash or pruritis. Neurological: No headache, change in muscle strength, +numbness or tingling of the left arm. No change in gait, balance, coordination, mood, affect, memory, mentation, behavior. Psychiatric: No anxiety or depression  Endocrine: No malaise or fever  Hematologic/Lymphatic: No abnormal bruising or bleeding, blood clots or swollen lymph nodes. Allergic/Immunologic: No nasal congestion or hives. Physical Examination:    Vitals:    01/01/23 2028 01/02/23 0030 01/02/23 0500 01/02/23 0830   BP: 120/62 116/69 117/63 114/64   Pulse: 69 97 60 64   Resp: 16 18 18 18   Temp: 98 °F (36.7 °C) 97.5 °F (36.4 °C) 97.5 °F (36.4 °C) 97.3 °F (36.3 °C)   TempSrc: Oral Oral Oral Oral   SpO2: 97% 95%  96%   Weight:   211 lb (95.7 kg)      Body mass index is 28.62 kg/m². Wt Readings from Last 3 Encounters:   01/02/23 211 lb (95.7 kg)   09/26/17 230 lb (104.3 kg)      BP Readings from Last 3 Encounters:   01/02/23 114/64   09/26/17 135/86        Physical Examination:    CONSTITUTIONAL: Well developed, well nourished, tattoos  EYES: PERRLA. No xanthelasma, sclera non icteric  EARS,NOSE,MOUTH,THROAT:  Mucous membranes moist, normal hearing  NECK: Supple, JVP normal, thyroid not enlarged.  Carotids 2+ without bruits  RESPIRATORY: Normal effort, no rales or rhonchi  CARDIOVASCULAR: Normal PMI, regular rate and rhythm, no murmurs, rub or gallop. No edema. Radial pulses present and equal  CHEST: No scar or masses  ABDOMEN: Normal bowel sounds. No masses or tenderness. No bruit  MUSCULOSKELETAL: No clubbing or cyanosis. Moves all extremities well. Normal gait  SKIN:  Warm and dry. No rashes  NEUROLOGIC: Cranial nerves intact. Alert and oriented  PSYCHIATRIC: anxious affect. Appears to have normal judgement and insight        Assessment/Plan  1. Left arm numbness - lasted several hours. He also had symptoms of upper left chest pain and difficulty finding his words. Neurology consultation was recommended by the stroke team.       2. Atypical chest pain as noted above with no previous exertional component and neg EKG and CE. He has a positive FH and smokes. He tells me his chol is \"ok\" but he had an elevated ldl of 127 in 2018 and 156 in 2014. 3. Tobacco and marijuana use - he states he will not stop the marijuana because he needs it to calm himself. There is no objective evidence for myocardial ischemia and he has atypical pain. I think his cardiac testing can be done as an OP but his neurologic symptoms were the presenting complaint and need to be addressed. The C spine disease can explain the numbness but not his inability to find his words. The smudge cells are another question. He should be started on a statin for his cholesterol and I will order lipitor. I have added a lipid to be done from the blood in the lab. I suspect it is still high. Thank you for allowing to me to participate in the care of Jan Stafford.

## 2023-01-02 NOTE — PROGRESS NOTES
Patient wants to leave, states he is upset that he was told he would be getting a stress test today however stress lab is closed in observation of the holiday and so it would be completed tomorrow. Cardiology is okay with out patient stress. Dr. Heron Fitzpatrick notified that patient is wanting to leave. I removed IV per patient request. Nils Dhillon brought to nurses station. Patient is getting dressed.

## 2023-01-02 NOTE — PLAN OF CARE
Problem: Discharge Planning  Goal: Discharge to home or other facility with appropriate resources  1/2/2023 0005 by Georges Mendez RN  Outcome: Progressing  1/1/2023 2348 by Georges Mendez RN  Outcome: Progressing  Flowsheets (Taken 1/1/2023 2028)  Discharge to home or other facility with appropriate resources:   Identify barriers to discharge with patient and caregiver   Identify discharge learning needs (meds, wound care, etc)     Problem: Pain  Goal: Verbalizes/displays adequate comfort level or baseline comfort level  1/2/2023 0005 by Georges Mendez RN  Outcome: Progressing  1/1/2023 2348 by Georges Mendez RN  Outcome: Progressing  1/1/2023 1656 by Melia Ho RN  Outcome: Progressing  Flowsheets (Taken 1/1/2023 1656)  Verbalizes/displays adequate comfort level or baseline comfort level: Encourage patient to monitor pain and request assistance  Note: Pt admitted to 3a from ER, no c/o pain at this time, /77   Pulse 68   Temp 98.7 °F (37.1 °C)   Resp 16   SpO2 99%   Oriented to room and call light, updated on plan of care ie stress test in am and troponinx3, pt requesting that his home meds be ordered, message sent to Dr. Leslie Crockett, SR on tele

## 2023-01-02 NOTE — PLAN OF CARE
Problem: Discharge Planning  Goal: Discharge to home or other facility with appropriate resources  Outcome: Progressing     Problem: Pain  Goal: Verbalizes/displays adequate comfort level or baseline comfort level  1/1/2023 2348 by Deb Valentine RN  Outcome: Progressing  1/1/2023 1656 by Margarita Pickett RN  Outcome: Progressing  Flowsheets (Taken 1/1/2023 1656)  Verbalizes/displays adequate comfort level or baseline comfort level: Encourage patient to monitor pain and request assistance  Note: Pt admitted to 3a from ER, no c/o pain at this time, /77   Pulse 68   Temp 98.7 °F (37.1 °C)   Resp 16   SpO2 99%   Oriented to room and call light, updated on plan of care ie stress test in am and troponinx3, pt requesting that his home meds be ordered, message sent to Dr. Winnie Hunt, SR on tele

## 2023-01-02 NOTE — DISCHARGE SUMMARY
Hospital Medicine Discharge Summary    Patient: Sherryle Camel     Gender: male  : 1975   Age: 56 y.o. MRN: [id number]    Admitting Physician: Mishel Mcgregor MD  Discharge Physician: Liz Ennis MD     Code Status: Full Code     Admit Date: 2023   Left AMA Date: 2023      Disposition:  AMA    Discharge Diagnoses: Active Hospital Problems    Diagnosis Date Noted    Chest pain [R07.9] 2023     Priority: Medium           Condition at Discharge:   Left [facility]:   55 yo M with asthma, obesity, tobacco abuse came to ER with chest pain. Placed in observation. Seen by Cardiology. Recommended for outpatient cardiac work up. Apparently had word finding difficulty. Patient left AMA before I could discuss any care with him. Discharge Medications:   Discharge Medication List as of 2023 11:11 AM        Discharge Medication List as of 2023 11:11 AM        Discharge Medication List as of 2023 11:11 AM        CONTINUE these medications which have NOT CHANGED    Details   ipratropium-albuterol (DUONEB) 0.5-2.5 (3) MG/3ML SOLN nebulizer solution Take 1 vial by nebulization in the morning and at bedtimeHistorical Med      theophylline (CLAUDIA-24) 200 MG extended release capsule Take 300 mg by mouth in the morning and at bedtimeHistorical Med      ALBUTEROL SULFATE IN Inhale 2 puffs into the lungsHistorical Med      ketorolac (TORADOL) 10 MG tablet Take 1 tablet by mouth every 6 hours as needed for Pain, Disp-20 tablet, R-0Print           Discharge Medication List as of 2023 11:11 AM            Discharge Exam:    /64   Pulse 64   Temp 97.3 °F (36.3 °C) (Oral)   Resp 18   Wt 211 lb (95.7 kg)   SpO2 96%   BMI 28.62 kg/m²   Left AMA    Labs:  For convenience and continuity at follow-up the following most recent labs are provided:    Lab Results   Component Value Date/Time    WBC 7.4 2023 05:11 AM    HGB 15.4 2023 05:11 AM    HCT 45.1 2023 05:11 AM    MCV 90.3 01/02/2023 05:11 AM     01/02/2023 05:11 AM     01/02/2023 05:11 AM    K 4.4 01/02/2023 05:11 AM     01/02/2023 05:11 AM    CO2 22 01/02/2023 05:11 AM    BUN 11 01/02/2023 05:11 AM    CREATININE 0.9 01/02/2023 05:11 AM    CALCIUM 9.1 01/02/2023 05:11 AM    ALKPHOS 50 01/01/2023 11:02 AM    ALT 14 01/01/2023 11:02 AM    AST 17 01/01/2023 11:02 AM    BILITOT 0.4 01/01/2023 11:02 AM    LABALBU 4.4 01/01/2023 11:02 AM     Lab Results   Component Value Date    INR 0.91 01/01/2023       Radiology:  CT HEAD WO CONTRAST    Addendum Date: 1/1/2023    ADDENDUM: Critical results were called by results communication to [facility] on 1/1/2023 at 11:26 a.m. Donna Regalado Result Date: 1/1/2023  EXAMINATION: CT OF THE HEAD WITHOUT CONTRAST  1/1/2023 11:11 am TECHNIQUE: CT of the head was performed without the administration of intravenous contrast. Automated exposure control, iterative reconstruction, and/or weight based adjustment of the mA/kV was utilized to reduce the radiation dose to as low as reasonably achievable. COMPARISON: None. HISTORY: ORDERING SYSTEM PROVIDED HISTORY: stroke TECHNOLOGIST PROVIDED HISTORY: Reason for exam:->stroke Has a \"code stroke\" or \"stroke alert\" been called? ->Yes Decision Support Exception - unselect if not a suspected or confirmed emergency medical condition->Emergency Medical Condition (MA) FINDINGS: BRAIN/VENTRICLES: There is no acute intracranial hemorrhage, mass effect or midline shift. No abnormal extra-axial fluid collection. The gray-white differentiation is maintained without evidence of an acute infarct. There is no evidence of hydrocephalus. ORBITS: The visualized portion of the orbits demonstrate no acute abnormality. SINUSES: The visualized paranasal sinuses and mastoid air cells demonstrate no acute abnormality. SOFT TISSUES/SKULL:  No acute abnormality of the visualized skull or soft tissues. No acute intracranial abnormality.      XR CHEST PORTABLE    Result Date: 1/1/2023  EXAMINATION: ONE XRAY VIEW OF THE CHEST 1/1/2023 11:14 am COMPARISON: None. HISTORY: ORDERING SYSTEM PROVIDED HISTORY: chest pain TECHNOLOGIST PROVIDED HISTORY: Reason for exam:->chest pain FINDINGS: The lungs are without acute focal process. There is no effusion or pneumothorax. The cardiomediastinal silhouette is without acute process. The osseous structures are without acute process. No acute process. CTA HEAD NECK W CONTRAST    Result Date: 1/1/2023  EXAMINATION: CTA OF THE HEAD AND NECK WITH CONTRAST 1/1/2023 11:14 am: TECHNIQUE: CTA of the head and neck was performed with the administration of intravenous contrast. Multiplanar reformatted images are provided for review. MIP images are provided for review. Stenosis of the internal carotid arteries measured using NASCET criteria. Automated exposure control, iterative reconstruction, and/or weight based adjustment of the mA/kV was utilized to reduce the radiation dose to as low as reasonably achievable. 3D reconstructed images were performed on a separate workstation and provided for review. This scan was analyzed using [employer]. ai contact LVO. Identification of suspected findings is not for diagnostic use beyond notification. Viz LVO is limited to analysis of imaging data and should not be used in-lieu of full patient evaluation or relied upon to make or confirm diagnosis. COMPARISON: None. HISTORY: ORDERING SYSTEM PROVIDED HISTORY: left arm and left tingling, chest pain, lightheadedness TECHNOLOGIST PROVIDED HISTORY: Reason for exam:->left arm and left tingling, chest pain, lightheadedness Has a \"code stroke\" or \"stroke alert\" been called? ->Yes Decision Support Exception - unselect if not a suspected or confirmed emergency medical condition->Emergency Medical Condition (MA) FINDINGS: CTA NECK: AORTIC ARCH/ARCH VESSELS: No dissection or arterial injury.   No significant stenosis of the brachiocephalic or subclavian arteries. CAROTID ARTERIES: No dissection, arterial injury, or hemodynamically significant stenosis by NASCET criteria. VERTEBRAL ARTERIES: No dissection, arterial injury, or significant stenosis. The left vertebral artery originates directly off of the aortic arch, anatomic variant. SOFT TISSUES: The lung apices are clear. No cervical or superior mediastinal lymphadenopathy. The larynx and pharynx are unremarkable. No acute abnormality of the salivary and thyroid glands. BONES: No acute osseous abnormality. Multilevel cervical spondylosis, most notable at C5-C6 and C6-C7 (left-sided neural foraminal narrowing). No high-grade bony spinal canal stenosis. Left lower 1st molar dental cavity. CTA HEAD: ANTERIOR CIRCULATION: No significant stenosis of the intracranial internal carotid, anterior cerebral, or middle cerebral arteries. No aneurysm. POSTERIOR CIRCULATION: No significant stenosis of the vertebral, basilar, or posterior cerebral arteries. 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 No aneurysm. OTHER: No dural venous sinus thrombosis on this non-dedicated study. BRAIN: No mass effect or midline shift. No extra-axial fluid collection. The gray-white differentiation is maintained. 1. No evidence of large vessel occlusion or significant stenosis in the major arteries of the head and neck. 2. Multilevel cervical spondylosis, most notable at C5-C6 and C6-C7 (left-sided neural foraminal narrowing). No high-grade bony spinal canal stenosis. The patient was seen and examined on day of discharge and this discharge summary is in conjunction with any daily progress note from day of discharge. Time Spent on discharge is 15 minutes  in the examination, evaluation, counseling and review of medications and discharge plan.       Note that less than 30 minutes was spent in preparing discharge papers, discussing discharge with patient, medication review, etc.       Signed:    Shelton Crigler, MD   1/2/2023      Thank you Elbert Limon MD for the opportunity to be involved in this patient's care.  If you have any questions or concerns please feel free to contact me at [address] [place] [address]